# Patient Record
Sex: FEMALE | Race: AMERICAN INDIAN OR ALASKA NATIVE | URBAN - METROPOLITAN AREA
[De-identification: names, ages, dates, MRNs, and addresses within clinical notes are randomized per-mention and may not be internally consistent; named-entity substitution may affect disease eponyms.]

---

## 2018-03-28 ENCOUNTER — MEDICAL CORRESPONDENCE (OUTPATIENT)
Dept: HEALTH INFORMATION MANAGEMENT | Facility: CLINIC | Age: 58
End: 2018-03-28

## 2018-03-28 ENCOUNTER — TRANSFERRED RECORDS (OUTPATIENT)
Dept: HEALTH INFORMATION MANAGEMENT | Facility: CLINIC | Age: 58
End: 2018-03-28

## 2018-04-05 ENCOUNTER — TELEPHONE (OUTPATIENT)
Dept: GASTROENTEROLOGY | Facility: CLINIC | Age: 58
End: 2018-04-05

## 2018-04-05 NOTE — TELEPHONE ENCOUNTER
Contact with pt's PCP office. Currently self-pay. she has signed papers for insurance coverage beginning 01-MAY-2018. Colonoscopy scheduled /c Dr. Flores 25-APR-2018 will need to be rescheduled for later in May after insurance is active. Pt's phone VM unavailable at this time. Clinic is to notify pt of cancellation and need to call Endoscopy Scheduling to reschedule.     Fouzia Patterson RN  [facility] / [facility] Endoscopy Scheduling

## 2024-01-09 ENCOUNTER — TRANSCRIBE ORDERS (OUTPATIENT)
Dept: OTHER | Age: 64
End: 2024-01-09

## 2024-01-09 ENCOUNTER — TRANSFERRED RECORDS (OUTPATIENT)
Dept: HEALTH INFORMATION MANAGEMENT | Facility: CLINIC | Age: 64
End: 2024-01-09

## 2024-01-09 DIAGNOSIS — R51.9 PERIORBITAL HEADACHE: ICD-10-CM

## 2024-01-09 DIAGNOSIS — R51.9 HEADACHE: Primary | ICD-10-CM

## 2024-02-09 ENCOUNTER — TRANSFERRED RECORDS (OUTPATIENT)
Dept: HEALTH INFORMATION MANAGEMENT | Facility: CLINIC | Age: 64
End: 2024-02-09

## 2024-04-12 ENCOUNTER — TRANSCRIBE ORDERS (OUTPATIENT)
Dept: OTHER | Age: 64
End: 2024-04-12

## 2024-04-12 DIAGNOSIS — R51.9 PERIORBITAL HEADACHE: Primary | ICD-10-CM
